# Patient Record
Sex: MALE | Race: OTHER | NOT HISPANIC OR LATINO | URBAN - METROPOLITAN AREA
[De-identification: names, ages, dates, MRNs, and addresses within clinical notes are randomized per-mention and may not be internally consistent; named-entity substitution may affect disease eponyms.]

---

## 2018-12-19 ENCOUNTER — INPATIENT (INPATIENT)
Facility: HOSPITAL | Age: 43
LOS: 0 days | Discharge: HOME | DRG: 603 | End: 2018-12-20
Attending: SPECIALIST | Admitting: SPECIALIST
Payer: COMMERCIAL

## 2018-12-19 ENCOUNTER — TRANSCRIPTION ENCOUNTER (OUTPATIENT)
Age: 43
End: 2018-12-19

## 2018-12-19 VITALS
OXYGEN SATURATION: 99 % | WEIGHT: 190.04 LBS | HEIGHT: 68 IN | RESPIRATION RATE: 985 BRPM | DIASTOLIC BLOOD PRESSURE: 75 MMHG | SYSTOLIC BLOOD PRESSURE: 119 MMHG | HEART RATE: 109 BPM | TEMPERATURE: 98 F

## 2018-12-19 DIAGNOSIS — L02.31 CUTANEOUS ABSCESS OF BUTTOCK: ICD-10-CM

## 2018-12-19 LAB
ANION GAP SERPL CALC-SCNC: 7 MMOL/L — SIGNIFICANT CHANGE UP (ref 5–17)
APTT BLD: 30.9 SEC — SIGNIFICANT CHANGE UP (ref 27.5–36.3)
BUN SERPL-MCNC: 19 MG/DL — HIGH (ref 7–18)
CALCIUM SERPL-MCNC: 8.6 MG/DL — SIGNIFICANT CHANGE UP (ref 8.4–10.5)
CHLORIDE SERPL-SCNC: 105 MMOL/L — SIGNIFICANT CHANGE UP (ref 96–108)
CO2 SERPL-SCNC: 28 MMOL/L — SIGNIFICANT CHANGE UP (ref 22–31)
CREAT SERPL-MCNC: 1.08 MG/DL — SIGNIFICANT CHANGE UP (ref 0.5–1.3)
EOSINOPHIL NFR BLD AUTO: 5 % — SIGNIFICANT CHANGE UP (ref 0–6)
GLUCOSE SERPL-MCNC: 101 MG/DL — HIGH (ref 70–99)
HCT VFR BLD CALC: 39.3 % — SIGNIFICANT CHANGE UP (ref 39–50)
HGB BLD-MCNC: 12.6 G/DL — LOW (ref 13–17)
INR BLD: 1.27 RATIO — HIGH (ref 0.88–1.16)
LACTATE SERPL-SCNC: 2 MMOL/L — SIGNIFICANT CHANGE UP (ref 0.7–2)
LYMPHOCYTES # BLD AUTO: 17 % — SIGNIFICANT CHANGE UP (ref 13–44)
MCHC RBC-ENTMCNC: 27.6 PG — SIGNIFICANT CHANGE UP (ref 27–34)
MCHC RBC-ENTMCNC: 32 GM/DL — SIGNIFICANT CHANGE UP (ref 32–36)
MCV RBC AUTO: 86.2 FL — SIGNIFICANT CHANGE UP (ref 80–100)
MONOCYTES NFR BLD AUTO: 8 % — SIGNIFICANT CHANGE UP (ref 2–14)
NEUTROPHILS NFR BLD AUTO: 70 % — SIGNIFICANT CHANGE UP (ref 43–77)
PLATELET # BLD AUTO: 269 K/UL — SIGNIFICANT CHANGE UP (ref 150–400)
POTASSIUM SERPL-MCNC: 4.5 MMOL/L — SIGNIFICANT CHANGE UP (ref 3.5–5.3)
POTASSIUM SERPL-SCNC: 4.5 MMOL/L — SIGNIFICANT CHANGE UP (ref 3.5–5.3)
PROTHROM AB SERPL-ACNC: 14.2 SEC — HIGH (ref 10–12.9)
RBC # BLD: 4.56 M/UL — SIGNIFICANT CHANGE UP (ref 4.2–5.8)
RBC # FLD: 11.7 % — SIGNIFICANT CHANGE UP (ref 10.3–14.5)
SODIUM SERPL-SCNC: 140 MMOL/L — SIGNIFICANT CHANGE UP (ref 135–145)
WBC # BLD: 19.6 K/UL — HIGH (ref 3.8–10.5)
WBC # FLD AUTO: 19.6 K/UL — HIGH (ref 3.8–10.5)

## 2018-12-19 PROCEDURE — 72193 CT PELVIS W/DYE: CPT | Mod: 26

## 2018-12-19 PROCEDURE — 99285 EMERGENCY DEPT VISIT HI MDM: CPT | Mod: 25

## 2018-12-19 RX ORDER — MORPHINE SULFATE 50 MG/1
4 CAPSULE, EXTENDED RELEASE ORAL ONCE
Qty: 0 | Refills: 0 | Status: DISCONTINUED | OUTPATIENT
Start: 2018-12-19 | End: 2018-12-19

## 2018-12-19 RX ORDER — PIPERACILLIN AND TAZOBACTAM 4; .5 G/20ML; G/20ML
3.38 INJECTION, POWDER, LYOPHILIZED, FOR SOLUTION INTRAVENOUS ONCE
Qty: 0 | Refills: 0 | Status: COMPLETED | OUTPATIENT
Start: 2018-12-19 | End: 2018-12-19

## 2018-12-19 RX ORDER — SODIUM CHLORIDE 9 MG/ML
1000 INJECTION INTRAMUSCULAR; INTRAVENOUS; SUBCUTANEOUS ONCE
Qty: 0 | Refills: 0 | Status: COMPLETED | OUTPATIENT
Start: 2018-12-19 | End: 2018-12-19

## 2018-12-19 RX ORDER — VANCOMYCIN HCL 1 G
1000 VIAL (EA) INTRAVENOUS ONCE
Qty: 0 | Refills: 0 | Status: COMPLETED | OUTPATIENT
Start: 2018-12-19 | End: 2018-12-19

## 2018-12-19 RX ADMIN — SODIUM CHLORIDE 1000 MILLILITER(S): 9 INJECTION INTRAMUSCULAR; INTRAVENOUS; SUBCUTANEOUS at 19:39

## 2018-12-19 RX ADMIN — MORPHINE SULFATE 4 MILLIGRAM(S): 50 CAPSULE, EXTENDED RELEASE ORAL at 19:37

## 2018-12-19 RX ADMIN — Medication 1000 MILLIGRAM(S): at 21:00

## 2018-12-19 RX ADMIN — PIPERACILLIN AND TAZOBACTAM 3.38 GRAM(S): 4; .5 INJECTION, POWDER, LYOPHILIZED, FOR SOLUTION INTRAVENOUS at 21:55

## 2018-12-19 RX ADMIN — Medication 250 MILLIGRAM(S): at 19:38

## 2018-12-19 RX ADMIN — MORPHINE SULFATE 4 MILLIGRAM(S): 50 CAPSULE, EXTENDED RELEASE ORAL at 20:27

## 2018-12-19 RX ADMIN — SODIUM CHLORIDE 1000 MILLILITER(S): 9 INJECTION INTRAMUSCULAR; INTRAVENOUS; SUBCUTANEOUS at 20:27

## 2018-12-19 RX ADMIN — SODIUM CHLORIDE 2000 MILLILITER(S): 9 INJECTION INTRAMUSCULAR; INTRAVENOUS; SUBCUTANEOUS at 23:14

## 2018-12-19 RX ADMIN — PIPERACILLIN AND TAZOBACTAM 200 GRAM(S): 4; .5 INJECTION, POWDER, LYOPHILIZED, FOR SOLUTION INTRAVENOUS at 21:25

## 2018-12-19 NOTE — ED ADULT NURSE NOTE - OBJECTIVE STATEMENT
C/O BOIL TO RIGHT BUTTOCKS FOR A WEEK ABOUT 2 DO7PDQM AGO STARTED TO DRAIN. C/O ABSCESS  TO RIGHT BUTTOCKS FOR A WEEK ABOUT 2 TO 3DAYS AGO STARTED TO DRAIN. RIGHT BUTTOCKS WITH AREA OF ABSCESS , REDNESS,INDURATION ANDFLUCTUANCE.

## 2018-12-19 NOTE — ED ADULT NURSE NOTE - ED STAT RN HANDOFF DETAILS
ENDORSED TO RHODA GUERIN ,SASHA ANTHONY AND  AWARE OF REPEAT VS. NO COMPLAINTS AT PRESENT , HEPLOCK IN PLACE NOREDNESS , SWELLING NOTED HEPLOCK SITE.

## 2018-12-19 NOTE — ED PROVIDER NOTE - OBJECTIVE STATEMENT
44 y/o M with no significant PMHx or PSHx presents to the ED with complaints of abscess to R buttock x 1 week. Patient states abscess began as small pimple which than grew in size. Patient reports subjective fever, chills and cold sweats. Denies any other acute complaints. NKDA.

## 2018-12-19 NOTE — ED PROVIDER NOTE - PHYSICAL EXAMINATION
14 x 12 cm area of induration and redness to R buttock area. 5 x 5 cm area of fluctuance with pin point white spot. No induration or fluctuance to pilonidal area or perianal abscess. 14 x 12 cm area of induration and redness to R buttock area. 5 x 5 cm area of fluctuance with pin point white spot. No induration or fluctuance to pilonidal area or perianal abscess. No perineal induration or discoloration.

## 2018-12-19 NOTE — ED PROVIDER NOTE - ATTENDING CONTRIBUTION TO CARE
Pt presented with cellulitis /abscess to right buttock area progressively enlarging  and tender x 1 week.  gen A&O x 3, nontoxic appearing, cooperative.  lungs CTA   CVS RRR S1S2  skin14 x 12 cm area of induration and redness to R buttock area. areas of discharge noted  A right buttock cellulitis- abscess  P: Pt admitted to surgical service for OR I&D.   IV Van and Zosyn and IVF administered. Pt agrees with admission. See progress note.

## 2018-12-19 NOTE — ED PROVIDER NOTE - PROGRESS NOTE DETAILS
Pending CT results. Face to face signout given to Dr Allred. Will need surgery consult, possible admission. HILLARY Malcolm (surgical HO) pt to be admitted to surgical service. I had a detailed discussion with the patient and/or guardian regarding the historical points, exam findings, and any diagnostic results supporting the admit diagnosis.

## 2018-12-19 NOTE — ED ADULT NURSE NOTE - SKIN INTEGRITY
ABSCESS TO RIGHT BUTTOCKS WITH TENDERNESS ABSCESS TO RIGHT BUTTOCKS WITH TENDERNESS, REDNESS,14 GVP10IO INDURATION ,5CM X5 CM FLUCTUANCE.

## 2018-12-19 NOTE — ED ADULT NURSE NOTE - NSIMPLEMENTINTERV_GEN_ALL_ED
Implemented All Universal Safety Interventions:  [place] to call system. Call bell, personal items and telephone within reach. Instruct patient to call for assistance. Room bathroom lighting operational. Non-slip footwear when patient is off stretcher. Physically safe environment: no spills, clutter or unnecessary equipment. Stretcher in lowest position, wheels locked, appropriate side rails in place.

## 2018-12-19 NOTE — ED PROVIDER NOTE - MEDICAL DECISION MAKING DETAILS
42 y/o M presents with worsening R buttock pain x 1 week. Patient reports night sweats, subjective fever and worsening pain. Patient is tachycardic on exam with large area of induration and fluctuance. Will obtain labs and CT to rule out deep abscess. IV fluids, IV antibiotics, pain medications and reassess.

## 2018-12-20 ENCOUNTER — TRANSCRIPTION ENCOUNTER (OUTPATIENT)
Age: 43
End: 2018-12-20

## 2018-12-20 VITALS
HEART RATE: 76 BPM | OXYGEN SATURATION: 100 % | DIASTOLIC BLOOD PRESSURE: 46 MMHG | SYSTOLIC BLOOD PRESSURE: 100 MMHG | TEMPERATURE: 98 F | RESPIRATION RATE: 19 BRPM

## 2018-12-20 DIAGNOSIS — L03.317 CELLULITIS OF BUTTOCK: ICD-10-CM

## 2018-12-20 DIAGNOSIS — L02.31 CUTANEOUS ABSCESS OF BUTTOCK: ICD-10-CM

## 2018-12-20 PROCEDURE — ZZZZZ: CPT

## 2018-12-20 RX ORDER — INFLUENZA VIRUS VACCINE 15; 15; 15; 15 UG/.5ML; UG/.5ML; UG/.5ML; UG/.5ML
0.5 SUSPENSION INTRAMUSCULAR ONCE
Qty: 0 | Refills: 0 | Status: DISCONTINUED | OUTPATIENT
Start: 2018-12-20 | End: 2018-12-20

## 2018-12-20 RX ORDER — DEXTROSE MONOHYDRATE, SODIUM CHLORIDE, AND POTASSIUM CHLORIDE 50; .745; 4.5 G/1000ML; G/1000ML; G/1000ML
1000 INJECTION, SOLUTION INTRAVENOUS
Qty: 0 | Refills: 0 | Status: DISCONTINUED | OUTPATIENT
Start: 2018-12-20 | End: 2018-12-20

## 2018-12-20 RX ORDER — PIPERACILLIN AND TAZOBACTAM 4; .5 G/20ML; G/20ML
3.38 INJECTION, POWDER, LYOPHILIZED, FOR SOLUTION INTRAVENOUS ONCE
Qty: 0 | Refills: 0 | Status: COMPLETED | OUTPATIENT
Start: 2018-12-20 | End: 2018-12-20

## 2018-12-20 RX ORDER — ENOXAPARIN SODIUM 100 MG/ML
40 INJECTION SUBCUTANEOUS DAILY
Qty: 0 | Refills: 0 | Status: DISCONTINUED | OUTPATIENT
Start: 2018-12-20 | End: 2018-12-20

## 2018-12-20 RX ORDER — AZTREONAM 2 G
1 VIAL (EA) INJECTION
Qty: 20 | Refills: 0 | OUTPATIENT
Start: 2018-12-20 | End: 2018-12-29

## 2018-12-20 RX ORDER — PIPERACILLIN AND TAZOBACTAM 4; .5 G/20ML; G/20ML
3.38 INJECTION, POWDER, LYOPHILIZED, FOR SOLUTION INTRAVENOUS EVERY 8 HOURS
Qty: 0 | Refills: 0 | Status: DISCONTINUED | OUTPATIENT
Start: 2018-12-20 | End: 2018-12-20

## 2018-12-20 RX ORDER — HYDROMORPHONE HYDROCHLORIDE 2 MG/ML
1 INJECTION INTRAMUSCULAR; INTRAVENOUS; SUBCUTANEOUS EVERY 4 HOURS
Qty: 0 | Refills: 0 | Status: DISCONTINUED | OUTPATIENT
Start: 2018-12-20 | End: 2018-12-20

## 2018-12-20 RX ADMIN — DEXTROSE MONOHYDRATE, SODIUM CHLORIDE, AND POTASSIUM CHLORIDE 100 MILLILITER(S): 50; .745; 4.5 INJECTION, SOLUTION INTRAVENOUS at 03:35

## 2018-12-20 RX ADMIN — PIPERACILLIN AND TAZOBACTAM 25 GRAM(S): 4; .5 INJECTION, POWDER, LYOPHILIZED, FOR SOLUTION INTRAVENOUS at 06:14

## 2018-12-20 RX ADMIN — PIPERACILLIN AND TAZOBACTAM 25 GRAM(S): 4; .5 INJECTION, POWDER, LYOPHILIZED, FOR SOLUTION INTRAVENOUS at 13:32

## 2018-12-20 RX ADMIN — PIPERACILLIN AND TAZOBACTAM 200 GRAM(S): 4; .5 INJECTION, POWDER, LYOPHILIZED, FOR SOLUTION INTRAVENOUS at 01:52

## 2018-12-20 NOTE — DISCHARGE NOTE ADULT - MEDICATION SUMMARY - MEDICATIONS TO TAKE
I will START or STAY ON the medications listed below when I get home from the hospital:    oxycodone-acetaminophen 5 mg-325 mg oral tablet  -- 1 tab(s) by mouth every 6 hours, As Needed -for moderate pain MDD:4 tablets   -- Caution federal law prohibits the transfer of this drug to any person other  than the person for whom it was prescribed.  May cause drowsiness.  Alcohol may intensify this effect.  Use care when operating dangerous machinery.  This prescription cannot be refilled.  This product contains acetaminophen.  Do not use  with any other product containing acetaminophen to prevent possible liver damage.  Using more of this medication than prescribed may cause serious breathing problems.    -- Indication: For Abscess of buttock, right    Bactrim  mg-160 mg oral tablet  -- 1 tab(s) by mouth 2 times a day   -- Avoid prolonged or excessive exposure to direct and/or artificial sunlight while taking this medication.  Finish all this medication unless otherwise directed by prescriber.  Medication should be taken with plenty of water.    -- Indication: For Abscess of buttock, right

## 2018-12-20 NOTE — DISCHARGE NOTE ADULT - OTHER SIGNIFICANT FINDINGS
< from: CT Pelvis w/ IV Cont (12.19.18 @ 22:37) >    EXAM:  CT PELVIS ONLY IC                            PROCEDURE DATE:  12/19/2018          INTERPRETATION:  CLINICAL INFORMATION: Boil on right buttocks. Rule out   abscess.    TECHNIQUE: Helical CT of the pelvis was performed after the   administration of intravenous contrast. 90 mls of Omnipaque 350 was   administered without complication and 10 mls were discarded. Coronal and   sagittal reformats were additionally performed. Evaluation of the bowel   is limited without oral contrast.    COMPARISON: No similar prior studies are available for comparison.    FINDINGS:  Organs: The visualized liver and kidneys are unremarkable.    Gastrointestinal tract: No obstruction or inflammation in the visualized   bowel. The appendix is unremarkable.    Pelvis: The urinary bladder, prostate and seminal vesicles are   unremarkable.    Miscellaneous: Enlarged right inguinal lymph node measuring 2.7 x 1.7 cm.   No free air or free fluid is demonstrated. Subcutaneous inflammatory   change and soft tissue swelling is seen in the right buttocks extending   into the posterior upper thigh likely representing a cellulitis. More   focal inflammatory change is seen in the medial right buttocks which   could represent early phlegmonous formation. No rim-enhancing fluid   collection is present to suggest an abscess.    Bones: Mild degenerative disc disease is seen at L5-S1.    IMPRESSION:  Subcutaneous inflammatory change and soft tissue swelling in the right   buttocks extending into the posterior upperthigh likely representing a   cellulitis. More focal inflammatory changein the medial right buttocks   which could represent early phlegmonous formation. No associated   rim-enhancing fluid collection to suggest an abscess.                DIONTE STRONG M.D., ATTENDING RADIOLOGIST  This document has been electronically signed. Dec 19 2018 11:21PM                < end of copied text >

## 2018-12-20 NOTE — DISCHARGE NOTE ADULT - PLAN OF CARE
resolve infection f/u with Dr. Oconnor in 1 week, Ambulate as tolerated, Change buttock packing every day and shower prior to packing. Take pain medication and antibiotics as prescribed. Keep your appointment with Dr. Oconnor.  Return to the emergency room immediately if you are having fever, chills, purulent drainage from your wound, significant bleeding and/or increased swelling at the operative site.

## 2018-12-20 NOTE — H&P ADULT - NSHPLABSRESULTS_GEN_ALL_CORE
< from: CT Pelvis w/ IV Cont (12.19.18 @ 22:37) >    Miscellaneous: Enlarged right inguinal lymph node measuring 2.7 x 1.7 cm.   No free air or free fluid is demonstrated. Subcutaneous inflammatory   change and soft tissue swelling is seen in the right buttocks extending   into the posterior upper thigh likely representing a cellulitis. More   focal inflammatory change is seen in the medial right buttocks which   could represent early phlegmonous formation. No rim-enhancing fluid   collection is present to suggest an abscess.    Bones: Mild degenerative disc disease is seen at L5-S1.    IMPRESSION:  Subcutaneous inflammatory change and soft tissue swelling in the right   buttocks extending into the posterior upperthigh likely representing a   cellulitis. More focal inflammatory changein the medial right buttocks   which could represent early phlegmonous formation. No associated   rim-enhancing fluid collection to suggest an abscess.    < end of copied text >

## 2018-12-20 NOTE — CONSULT NOTE ADULT - SUBJECTIVE AND OBJECTIVE BOX
Patient is a 43y old  Male who presents with a chief complaint of right butt abscess.       History of Present Illness:  History of Present Illness: 	  44 y/o M with no significant PMHx or PSHx presents to the ED with complaints of abscess to R buttock x 1 week. Patient states abscess began as small pimple which than grew in size.   Patient reports subjective fever, chills and cold sweats. Denies any other acute complaints. NKDA    INTERVAL HPI/OVERNIGHT EVENTS:  T(C): 37.2 (12-20-18 @ 05:45), Max: 37.8 (12-20-18 @ 01:27)  HR: 74 (12-20-18 @ 05:45) (74 - 109)  BP: 97/45 (12-20-18 @ 05:45) (95/50 - 119/75)  RR: 16 (12-20-18 @ 05:45) (16 - 985)  SpO2: 97% (12-20-18 @ 05:45) (97% - 100%)  Wt(kg): --  I&O's Summary      PAST MEDICAL & SURGICAL HISTORY:  No pertinent past medical history  No significant past surgical history      SOCIAL HISTORY  Alcohol:  Tobacco:  Illicit substance use:      FAMILY HISTORY:      LABS:                        12.6   19.6  )-----------( 269      ( 19 Dec 2018 19:27 )             39.3     12-19    140  |  105  |  19<H>  ----------------------------<  101<H>  4.5   |  28  |  1.08    Ca    8.6      19 Dec 2018 19:27      PT/INR - ( 19 Dec 2018 19:27 )   PT: 14.2 sec;   INR: 1.27 ratio         PTT - ( 19 Dec 2018 19:27 )  PTT:30.9 sec    CAPILLARY BLOOD GLUCOSE                MEDICATIONS  (STANDING):  dextrose 5% + sodium chloride 0.45% with potassium chloride 20 mEq/L 1000 milliLiter(s) (100 mL/Hr) IV Continuous <Continuous>  enoxaparin Injectable 40 milliGRAM(s) SubCutaneous daily  influenza   Vaccine 0.5 milliLiter(s) IntraMuscular once  piperacillin/tazobactam IVPB. 3.375 Gram(s) IV Intermittent every 8 hours    MEDICATIONS  (PRN):  HYDROmorphone  Injectable 1 milliGRAM(s) IV Push every 4 hours PRN Moderate Pain (4 - 6)      REVIEW OF SYSTEMS:  CONSTITUTIONAL: No fever, weight loss, or fatigue  EYES: No eye pain, visual disturbances, or discharge  ENMT:  No difficulty hearing, tinnitus, vertigo; No sinus or throat pain  NECK: No pain or stiffness  RESPIRATORY: No cough, wheezing, chills or hemoptysis; No shortness of breath  CARDIOVASCULAR: No chest pain, palpitations, dizziness, or leg swelling  GASTROINTESTINAL: No abdominal or epigastric pain. No nausea, vomiting, or hematemesis; No diarrhea or constipation. No melena or hematochezia.  GENITOURINARY: No dysuria, frequency, hematuria, or incontinence  NEUROLOGICAL: No headaches, memory loss, loss of strength, numbness, or tremors  SKIN: No itching, burning, rashes, or lesions   LYMPH NODES: No enlarged glands  ENDOCRINE: No heat or cold intolerance; No hair loss  MUSCULOSKELETAL: No joint pain or swelling; No muscle, back, or extremity pain  PSYCHIATRIC: No depression, anxiety, mood swings, or difficulty sleeping  HEME/LYMPH: No easy bruising, or bleeding gums  ALLERY AND IMMUNOLOGIC: No hives or eczema    PHYSICAL EXAM:  GENERAL: NAD, well-groomed, well-developed  HEAD:  Atraumatic, Normocephalic  EYES: EOMI, PERRLA, conjunctiva and sclera clear  ENMT: No tonsillar erythema, exudates, or enlargement; Moist mucous membranes, Good dentition, No lesions  NECK: Supple, No JVD, Normal thyroid  NERVOUS SYSTEM:  Alert & Oriented X3, Good concentration; Motor Strength 5/5 B/L upper and lower extremities; DTRs 2+ intact and symmetric  CHEST/LUNG: Clear to percussion bilaterally; No rales, rhonchi, wheezing, or rubs  HEART: Regular rate and rhythm; No murmurs, rubs, or gallops  ABDOMEN: Soft, Nontender, Nondistended; Bowel sounds present  EXTREMITIES:  Right gluteal cellulitis  LYMPH: No lymphadenopathy noted  SKIN: No rashes or lesions    RADIOLOGY & ADDITIONAL TESTS:    Imaging Personally Reviewed:  [ x] YES  [ ] NO    Consultant(s) Notes Reviewed:  [x ] YES  [ ] NO        Care Discussed with Consultants/Other Providers [ ] YES  [ ] NO Patient is a 43y old  Male who presents with a chief complaint of right butt abscess.       History of Present Illness:  History of Present Illness: 	  44 y/o M with no significant PMHx or PSHx presents to the ED with complaints of abscess to R buttock x 1 week. Patient states abscess began as small pimple which than grew in size.   Patient reports subjective fever, chills and cold sweats. Denies any other acute complaints. NKDA    INTERVAL HPI/OVERNIGHT EVENTS:  T(C): 37.2 (12-20-18 @ 05:45), Max: 37.8 (12-20-18 @ 01:27)  HR: 74 (12-20-18 @ 05:45) (74 - 109)  BP: 97/45 (12-20-18 @ 05:45) (95/50 - 119/75)  RR: 16 (12-20-18 @ 05:45) (16 - 985)  SpO2: 97% (12-20-18 @ 05:45) (97% - 100%)  Wt(kg): --  I&O's Summary      PAST MEDICAL & SURGICAL HISTORY:  No pertinent past medical history  No significant past surgical history      SOCIAL HISTORY  Alcohol:  Tobacco:  Illicit substance use:      FAMILY HISTORY:      LABS:                        12.6   19.6  )-----------( 269      ( 19 Dec 2018 19:27 )             39.3     12-19    140  |  105  |  19<H>  ----------------------------<  101<H>  4.5   |  28  |  1.08    Ca    8.6      19 Dec 2018 19:27      PT/INR - ( 19 Dec 2018 19:27 )   PT: 14.2 sec;   INR: 1.27 ratio         PTT - ( 19 Dec 2018 19:27 )  PTT:30.9 sec    CAPILLARY BLOOD GLUCOSE                MEDICATIONS  (STANDING):  dextrose 5% + sodium chloride 0.45% with potassium chloride 20 mEq/L 1000 milliLiter(s) (100 mL/Hr) IV Continuous <Continuous>  enoxaparin Injectable 40 milliGRAM(s) SubCutaneous daily  influenza   Vaccine 0.5 milliLiter(s) IntraMuscular once  piperacillin/tazobactam IVPB. 3.375 Gram(s) IV Intermittent every 8 hours    MEDICATIONS  (PRN):  HYDROmorphone  Injectable 1 milliGRAM(s) IV Push every 4 hours PRN Moderate Pain (4 - 6)      REVIEW OF SYSTEMS:  CONSTITUTIONAL: No fever, weight loss, or fatigue  EYES: No eye pain, visual disturbances, or discharge  ENMT:  No difficulty hearing, tinnitus, vertigo; No sinus or throat pain  NECK: No pain or stiffness  RESPIRATORY: No cough, wheezing, chills or hemoptysis; No shortness of breath  CARDIOVASCULAR: No chest pain, palpitations, dizziness, or leg swelling  GASTROINTESTINAL: No abdominal or epigastric pain. No nausea, vomiting, or hematemesis; No diarrhea or constipation. No melena or hematochezia.  GENITOURINARY: No dysuria, frequency, hematuria, or incontinence  NEUROLOGICAL: No headaches, memory loss, loss of strength, numbness, or tremors  SKIN: No itching, burning, rashes, or lesions   LYMPH NODES: No enlarged glands  ENDOCRINE: No heat or cold intolerance; No hair loss  MUSCULOSKELETAL: Swelling of right butt  PSYCHIATRIC: No depression, anxiety, mood swings, or difficulty sleeping  HEME/LYMPH: No easy bruising, or bleeding gums  ALLERY AND IMMUNOLOGIC: No hives or eczema    PHYSICAL EXAM:  GENERAL: NAD, well-groomed, well-developed  HEAD:  Atraumatic, Normocephalic  EYES: EOMI, PERRLA, conjunctiva and sclera clear  ENMT: No tonsillar erythema, exudates, or enlargement; Moist mucous membranes, Good dentition, No lesions  NECK: Supple, No JVD, Normal thyroid  NERVOUS SYSTEM:  Alert & Oriented X3, Good concentration; Motor Strength 5/5 B/L upper and lower extremities; DTRs 2+ intact and symmetric  CHEST/LUNG: Clear to percussion bilaterally; No rales, rhonchi, wheezing, or rubs  HEART: Regular rate and rhythm; No murmurs, rubs, or gallops  ABDOMEN: Soft, Nontender, Nondistended; Bowel sounds present  EXTREMITIES:  Right gluteal tenderness and redness  LYMPH: No lymphadenopathy noted  SKIN: No rashes or lesions    RADIOLOGY & ADDITIONAL TESTS:    Imaging Personally Reviewed:  [ x] YES  [ ] NO    Consultant(s) Notes Reviewed:  [x ] YES  [ ] NO        Care Discussed with Consultants/Other Providers [ ] YES  [ ] NO

## 2018-12-20 NOTE — DISCHARGE NOTE ADULT - PATIENT PORTAL LINK FT
You can access the [facility] Patient Portal, offered by [facility], by registering with the following website: [email or web address]

## 2018-12-20 NOTE — DISCHARGE NOTE ADULT - HOSPITAL COURSE
44 y/o M with no significant PMHx or PSHx presents to the ED with complaints of abscess to R buttock x 1 week. Patient states abscess began as small pimple which than grew in size.   Patient reports subjective fever, chills and cold sweats. Denies any other acute complaints. NKDA. (20 Dec 2018 01:09)    Pt underwent incision and drainage for right buttock abscess. Packing was placed in wound. Pt wanted to be discharged home. Pt states his wife can perform  the dressing changes.    Pt advised to take pain medication and antibiotics as prescribed. Pt is encouraged to shower daily and pack his Right buttock wound every day. Pt is encouraged to keep his appointment with Dr. Oconnor.  Pt was informed he should return to the emergency room immediately if he has fever, chills, purulent drainage from his wound, significant bleeding and/or increased swelling at the operative site. Pt demonstrated understanding and still wanted to be discharged home. 44 y/o M with no significant PMHx or PSHx presents to the ED with complaints of abscess to R buttock x 1 week. Patient states abscess began as small pimple which than grew in size.   Patient reports subjective fever, chills and cold sweats. Denies any other acute complaints. NKDA. (20 Dec 2018 01:09)    Pt underwent incision and drainage for right buttock abscess. Packing was placed in wound. Pt wanted to be discharged home. Pt states his wife can perform  the dressing changes.    Pt advised to take pain medication and antibiotics as prescribed. Pt is encouraged to shower daily and pack his Right buttock wound every day. Pt is encouraged to keep his appointment with Dr. Oconnor.  Pt was informed he should return to the emergency room immediately if he has fever, chills, purulent drainage from his wound, significant bleeding and/or increased swelling at the operative site. Pt informed of high WBC which is indicative of infection and was recommended to stay to observe and evaluate for improvement. Pt demonstrated understanding and still wanted to be discharged home. It was at the discretion of the surgeon, Dr. Oconnor, that patient can be discharged home.

## 2018-12-20 NOTE — H&P ADULT - NSHPPHYSICALEXAM_GEN_ALL_CORE
PHYSICAL EXAM:    GENERAL: NAD, well-groomed, well-developed  HEAD:  Atraumatic, Normocephalic  EYES: EOMI, PERRL, conjunctiva and sclera clear  ENMT: Moist mucous membranes, Good dentition, No lesions  NECK: Supple, No JVD  NERVOUS SYSTEM:  Alert & Oriented X3, Good concentration  CHEST/LUNG: Clear to percussion bilaterally; Normal respiratory effort  HEART: Regular rate and rhythm  ABDOMEN: Soft, Nontender, Nondistended; Bowel sounds present  : normal external genitalia  BREASTS: no breast lumps  EXTREMITIES:  Right gluteal cellulitis  VASC: equal peripheral pulses  LYMPH: No lymphadenopathy noted  SKIN: No rashes or lesions  PSYCH: normal affect

## 2018-12-20 NOTE — H&P ADULT - HISTORY OF PRESENT ILLNESS
42 y/o M with no significant PMHx or PSHx presents to the ED with complaints of abscess to R buttock x 1 week. Patient states abscess began as small pimple which than grew in size.   Patient reports subjective fever, chills and cold sweats. Denies any other acute complaints. NKDA.

## 2018-12-20 NOTE — BRIEF OPERATIVE NOTE - PROCEDURE
<<-----Click on this checkbox to enter Procedure Incision and drainage of buttock  12/20/2018    Active  [facility]

## 2018-12-20 NOTE — DISCHARGE NOTE ADULT - CARE PLAN
Principal Discharge DX:	Abscess of buttock, right  Goal:	resolve infection  Assessment and plan of treatment:	f/u with Dr. Oconnor in 1 week, Ambulate as tolerated, Change buttock packing every day and shower prior to packing. Take pain medication and antibiotics as prescribed. Keep your appointment with Dr. Oconnor.  Return to the emergency room immediately if you are having fever, chills, purulent drainage from your wound, significant bleeding and/or increased swelling at the operative site.

## 2018-12-20 NOTE — PATIENT PROFILE ADULT - HAS THE PATIENT RECEIVED THE INFLUENZA VACCINE THIS SEASON?
no... Patient baseline mental status/No adverse reaction to first time med in ED/Awake/Alert and oriented to person, place and time

## 2018-12-21 PROCEDURE — 86850 RBC ANTIBODY SCREEN: CPT

## 2018-12-21 PROCEDURE — 85610 PROTHROMBIN TIME: CPT

## 2018-12-21 PROCEDURE — 83605 ASSAY OF LACTIC ACID: CPT

## 2018-12-21 PROCEDURE — 87186 SC STD MICRODIL/AGAR DIL: CPT

## 2018-12-21 PROCEDURE — 72193 CT PELVIS W/DYE: CPT

## 2018-12-21 PROCEDURE — 85730 THROMBOPLASTIN TIME PARTIAL: CPT

## 2018-12-21 PROCEDURE — 87075 CULTR BACTERIA EXCEPT BLOOD: CPT

## 2018-12-21 PROCEDURE — 99285 EMERGENCY DEPT VISIT HI MDM: CPT | Mod: 25

## 2018-12-21 PROCEDURE — 96374 THER/PROPH/DIAG INJ IV PUSH: CPT | Mod: XU

## 2018-12-21 PROCEDURE — 96375 TX/PRO/DX INJ NEW DRUG ADDON: CPT | Mod: 76

## 2018-12-21 PROCEDURE — 85027 COMPLETE CBC AUTOMATED: CPT

## 2018-12-21 PROCEDURE — 86901 BLOOD TYPING SEROLOGIC RH(D): CPT

## 2018-12-21 PROCEDURE — 80048 BASIC METABOLIC PNL TOTAL CA: CPT

## 2018-12-21 PROCEDURE — 86900 BLOOD TYPING SEROLOGIC ABO: CPT

## 2018-12-21 PROCEDURE — 87070 CULTURE OTHR SPECIMN AEROBIC: CPT

## 2018-12-21 PROCEDURE — 87205 SMEAR GRAM STAIN: CPT

## 2018-12-22 LAB
-  AMPICILLIN/SULBACTAM: SIGNIFICANT CHANGE UP
-  CEFAZOLIN: SIGNIFICANT CHANGE UP
-  CLINDAMYCIN: SIGNIFICANT CHANGE UP
-  DAPTOMYCIN: SIGNIFICANT CHANGE UP
-  ERYTHROMYCIN: SIGNIFICANT CHANGE UP
-  GENTAMICIN: SIGNIFICANT CHANGE UP
-  LINEZOLID: SIGNIFICANT CHANGE UP
-  OXACILLIN: SIGNIFICANT CHANGE UP
-  PENICILLIN: SIGNIFICANT CHANGE UP
-  RIFAMPIN: SIGNIFICANT CHANGE UP
-  TETRACYCLINE: SIGNIFICANT CHANGE UP
-  TRIMETHOPRIM/SULFAMETHOXAZOLE: SIGNIFICANT CHANGE UP
-  VANCOMYCIN: SIGNIFICANT CHANGE UP
METHOD TYPE: SIGNIFICANT CHANGE UP

## 2018-12-25 LAB
CULTURE RESULTS: SIGNIFICANT CHANGE UP
ORGANISM # SPEC MICROSCOPIC CNT: SIGNIFICANT CHANGE UP
ORGANISM # SPEC MICROSCOPIC CNT: SIGNIFICANT CHANGE UP
SPECIMEN SOURCE: SIGNIFICANT CHANGE UP

## 2018-12-27 PROBLEM — Z00.00 ENCOUNTER FOR PREVENTIVE HEALTH EXAMINATION: Status: ACTIVE | Noted: 2018-12-27

## 2018-12-28 PROBLEM — A49.02 INFECTION OF WOUND DUE TO METHICILLIN RESISTANT STAPHYLOCOCCUS AUREUS (MRSA): Status: RESOLVED | Noted: 2018-12-28 | Resolved: 2018-12-28

## 2018-12-28 PROBLEM — L02.31 ABSCESS OF BUTTOCK: Status: ACTIVE | Noted: 2018-12-28

## 2018-12-31 ENCOUNTER — APPOINTMENT (OUTPATIENT)
Dept: SURGERY | Facility: CLINIC | Age: 43
End: 2018-12-31
Payer: COMMERCIAL

## 2018-12-31 DIAGNOSIS — A49.02 METHICILLIN RESISTANT STAPHYLOCOCCUS AUREUS INFECTION, UNSPECIFIED SITE: ICD-10-CM

## 2018-12-31 DIAGNOSIS — Z78.9 OTHER SPECIFIED HEALTH STATUS: ICD-10-CM

## 2018-12-31 DIAGNOSIS — L02.31 CUTANEOUS ABSCESS OF BUTTOCK: ICD-10-CM

## 2018-12-31 PROCEDURE — 99024 POSTOP FOLLOW-UP VISIT: CPT

## 2019-01-08 ENCOUNTER — APPOINTMENT (OUTPATIENT)
Dept: SURGERY | Facility: CLINIC | Age: 44
End: 2019-01-08

## 2025-09-02 ENCOUNTER — APPOINTMENT (OUTPATIENT)
Age: 50
End: 2025-09-02
Payer: SELF-PAY

## 2025-09-02 ENCOUNTER — NON-APPOINTMENT (OUTPATIENT)
Age: 50
End: 2025-09-02

## 2025-09-02 ENCOUNTER — TRANSCRIPTION ENCOUNTER (OUTPATIENT)
Age: 50
End: 2025-09-02

## 2025-09-02 ENCOUNTER — APPOINTMENT (OUTPATIENT)
Age: 50
End: 2025-09-02
Payer: COMMERCIAL

## 2025-09-02 PROCEDURE — 92004 COMPRE OPH EXAM NEW PT 1/>: CPT

## 2025-09-02 PROCEDURE — 92015 DETERMINE REFRACTIVE STATE: CPT
